# Patient Record
Sex: MALE | Race: WHITE | URBAN - METROPOLITAN AREA
[De-identification: names, ages, dates, MRNs, and addresses within clinical notes are randomized per-mention and may not be internally consistent; named-entity substitution may affect disease eponyms.]

---

## 2017-07-27 ENCOUNTER — OFFICE VISIT (OUTPATIENT)
Dept: OPTOMETRY | Facility: CLINIC | Age: 52
End: 2017-07-27
Payer: COMMERCIAL

## 2017-07-27 DIAGNOSIS — H52.4 PRESBYOPIA: ICD-10-CM

## 2017-07-27 DIAGNOSIS — H52.13 MYOPIA OF BOTH EYES: Primary | ICD-10-CM

## 2017-07-27 DIAGNOSIS — H40.003 GLAUCOMA SUSPECT, BILATERAL: ICD-10-CM

## 2017-07-27 DIAGNOSIS — H52.223 REGULAR ASTIGMATISM OF BOTH EYES: ICD-10-CM

## 2017-07-27 PROCEDURE — 92014 COMPRE OPH EXAM EST PT 1/>: CPT | Performed by: OPTOMETRIST

## 2017-07-27 PROCEDURE — 92015 DETERMINE REFRACTIVE STATE: CPT | Performed by: OPTOMETRIST

## 2017-07-27 ASSESSMENT — REFRACTION_MANIFEST
OD_AXIS: 075
OS_CYLINDER: +1.00
OS_SPHERE: -3.75
OS_SPHERE: -3.25
OD_AXIS: 077
OD_SPHERE: -3.50
OD_SPHERE: -3.75
OS_ADD: +2.00
OD_CYLINDER: +0.25
METHOD_AUTOREFRACTION: 1
OS_CYLINDER: +0.75
OS_AXIS: 100
OD_CYLINDER: +0.50
OS_AXIS: 083
OD_ADD: +2.00

## 2017-07-27 ASSESSMENT — REFRACTION_WEARINGRX
OS_SPHERE: -3.50
OS_SPHERE: -3.25
OS_ADD: +2.00
OD_ADD: +2.00
OS_CYLINDER: +0.75
OS_CYLINDER: +0.50
OD_AXIS: 080
OD_SPHERE: -3.50
SPECS_TYPE: PAL
OD_SPHERE: -3.75
OS_AXIS: 092
OD_CYLINDER: +0.25
OS_AXIS: 100
OD_CYLINDER: +0.50
OS_ADD: +2.00
OD_AXIS: 085
OD_ADD: +2.00
SPECS_TYPE: PAL

## 2017-07-27 ASSESSMENT — CONF VISUAL FIELD
OD_NORMAL: 1
OS_NORMAL: 1

## 2017-07-27 ASSESSMENT — TONOMETRY
OD_IOP_MMHG: 16
IOP_METHOD: APPLANATION
OS_IOP_MMHG: 16

## 2017-07-27 ASSESSMENT — KERATOMETRY
OS_K1POWER_DIOPTERS: 41.75
OD_K1POWER_DIOPTERS: 41.75
OS_K2POWER_DIOPTERS: 43.25
OD_AXISANGLE2_DEGREES: 168
OD_K2POWER_DIOPTERS: 42.50
OS_AXISANGLE2_DEGREES: 165

## 2017-07-27 ASSESSMENT — VISUAL ACUITY
OS_CC: 20/20
CORRECTION_TYPE: GLASSES
OD_CC: 20/20
OS_CC: 20/25-1
METHOD: SNELLEN - LINEAR
OD_CC: 20/20

## 2017-07-27 ASSESSMENT — PACHYMETRY
OS_CT(UM): 592
OD_CT(UM): 601

## 2017-07-27 NOTE — PROGRESS NOTES
Chief Complaint   Patient presents with     COMPREHENSIVE EYE EXAM     yearly         Last Eye Exam: 1/28/2016  Dilated Previously: Yes    What are you currently using to see?  Glasses, wears glasses all of the time, unless he's reading in bed       Distance Vision Acuity: Satisfied with vision, no changes noted. Glasses still seem to be ok.     Near Vision Acuity: Satisfied with vision while reading and using computer with glasses    Eye Comfort: good  Do you use eye drops? : No  Occupation or Hobbies: Manager, [employer]     [employer]           Medical, surgical and family histories reviewed and updated 7/27/2017.       OBJECTIVE: See Ophthalmology exam    ASSESSMENT:    ICD-10-CM    1. Myopia of both eyes H52.13 EYE EXAM (SIMPLE-NONBILLABLE)     REFRACTIVE STATUS   2. Regular astigmatism of both eyes H52.223 EYE EXAM (SIMPLE-NONBILLABLE)     REFRACTIVE STATUS   3. Presbyopia H52.4 EYE EXAM (SIMPLE-NONBILLABLE)     REFRACTIVE STATUS   4. Glaucoma suspect, bilateral H40.003 EYE EXAM (SIMPLE-NONBILLABLE)     REFRACTIVE STATUS    reviewed records of past glaucoma testing    PLAN:     Patient Instructions   Patient was advised of today's exam findings.  Fill glasses prescription as needed  Mild glaucoma risk factor discussed, will consider repeat visual fields, and initial OCT at [facility] [place] at next eye exam   Return in 1 year for eye exam    Ivy Bartlett O.D.  [facility]   [address] [place] [address] [phone]

## 2017-07-27 NOTE — PATIENT INSTRUCTIONS
Patient was advised of today's exam findings.  Fill glasses prescription as needed  Mild glaucoma risk factor discussed, will consider repeat visual fields, and initial OCT at [facility] [place] at next eye exam   Return in 1 year for eye exam    Ivy Bartlett O.D.  [facility]   [address] [place] [address] [phone]

## 2017-07-27 NOTE — MR AVS SNAPSHOT
After Visit Summary   7/27/2017    Jeffery Nelson    MRN: [id number]           Patient Information     Date Of Birth          1965        Visit Information        Provider Department      7/27/2017 3:30 PM Ivy Bartlett, OD [facility]        Today's Diagnoses     Myopia of both eyes    -  1    Regular astigmatism of both eyes          Care Instructions    Patient was advised of today's exam findings.  Fill glasses prescription as needed  Mild glaucoma risk factor discussed, will consider repeat visual fields, and initial OCT at [facility] [place] at next eye exam   Return in 1 year for eye exam    Ivy Bartlett O.D.  [facility]   [address] [place] [address]  [phone]            Follow-ups after your visit        Follow-up notes from your care team     Return in about 1 year (around 7/27/2018) for Eye Exam to rule out glaucoma.      Who to contact     If you have questions or need follow up information about today's clinic visit or your schedule please contact [facility] directly at [phone].  Normal or non-critical lab and imaging results will be communicated to you by Ubitricityhart, letter or phone within 4 business days after the clinic has received the results. If you do not hear from us within 7 days, please contact the clinic through Ubitricityhart or phone. If you have a critical or abnormal lab result, we will notify you by phone as soon as possible.  Submit refill requests through Chargeback or call your pharmacy and they will forward the refill request to us. Please allow 3 business days for your refill to be completed.          Additional Information About Your Visit        MyChart Information     Chargeback gives you secure access to your electronic health record. If you see a primary care provider, you can also send messages to your care team and make appointments. If you have questions, please call your primary care clinic.   If you do not have a primary care provider, please call [phone] and they will assist you.        Care EveryWhere ID     This is your Care EveryWhere ID. This could be used by other organizations to access your [facility] medical records  [id number]         Blood Pressure from Last 3 Encounters:   04/11/16 139/89   01/21/16 128/74   01/12/16 126/80    Weight from Last 3 Encounters:   04/04/16 118.4 kg (261 lb)   01/21/16 119.3 kg (263 lb)   01/12/16 119.3 kg (263 lb)              Today, you had the following     No orders found for display       Primary Care Provider Office Phone # Fax #    Aravind Fonseca PA-C [phone]       [facility] [place] [address]  [place] [address]        Equal Access to Services     [facility]: Hadii aad ku hadasho Soomaali, waaxda luqadaha, qaybta kaalmada adeegyada, waxay idiin hayaan adejohn kharatamica flores . So [facility] [phone].    ATENCIÓN: Si habla español, tiene a gaxiola disposición servicios gratuitos de asistencia lingüística. Llame al [phone].    We comply with applicable federal civil rights laws and [place] laws. We do not discriminate on the basis of race, color, national origin, age, disability sex, sexual orientation or gender identity.            Thank you!     [facility] you for [facility]  [facility]. Our goal is always to provide you with excellent care. Hearing back from our patients is one way we can continue to improve our services. Please take a few minutes to complete the written survey that you may receive in the mail after your visit with us. Thank you!             Your Updated Medication List - Protect others around you: Learn how to safely use, store and throw away your medicines at www.disposemymeds.org.          This list is accurate as of: 7/27/17  6:05 PM.  Always use your most recent med list.                   Brand Name Dispense Instructions for use Diagnosis    MULTIVITAMINS PO      take  by mouth.

## 2017-07-29 ASSESSMENT — CUP TO DISC RATIO
OD_RATIO: 0.7
OS_RATIO: 0.8

## 2017-07-29 ASSESSMENT — EXTERNAL EXAM - RIGHT EYE: OD_EXAM: NORMAL

## 2017-07-29 ASSESSMENT — SLIT LAMP EXAM - LIDS
COMMENTS: NORMAL
COMMENTS: NORMAL

## 2017-07-29 ASSESSMENT — EXTERNAL EXAM - LEFT EYE: OS_EXAM: NORMAL

## 2020-02-10 ENCOUNTER — HEALTH MAINTENANCE LETTER (OUTPATIENT)
Age: 55
End: 2020-02-10

## 2020-11-14 ENCOUNTER — HEALTH MAINTENANCE LETTER (OUTPATIENT)
Age: 55
End: 2020-11-14

## 2021-04-03 ENCOUNTER — HEALTH MAINTENANCE LETTER (OUTPATIENT)
Age: 56
End: 2021-04-03

## 2021-09-12 ENCOUNTER — HEALTH MAINTENANCE LETTER (OUTPATIENT)
Age: 56
End: 2021-09-12

## 2022-04-24 ENCOUNTER — HEALTH MAINTENANCE LETTER (OUTPATIENT)
Age: 57
End: 2022-04-24

## 2022-11-19 ENCOUNTER — HEALTH MAINTENANCE LETTER (OUTPATIENT)
Age: 57
End: 2022-11-19

## 2023-06-01 ENCOUNTER — HEALTH MAINTENANCE LETTER (OUTPATIENT)
Age: 58
End: 2023-06-01